# Patient Record
Sex: FEMALE | Race: WHITE | NOT HISPANIC OR LATINO | URBAN - METROPOLITAN AREA
[De-identification: names, ages, dates, MRNs, and addresses within clinical notes are randomized per-mention and may not be internally consistent; named-entity substitution may affect disease eponyms.]

---

## 2024-07-22 ENCOUNTER — OFFICE VISIT (OUTPATIENT)
Dept: OTOLARYNGOLOGY | Facility: CLINIC | Age: 69
End: 2024-07-22
Payer: MEDICARE

## 2024-07-22 ENCOUNTER — CLINICAL SUPPORT (OUTPATIENT)
Dept: AUDIOLOGY | Facility: CLINIC | Age: 69
End: 2024-07-22
Payer: MEDICARE

## 2024-07-22 DIAGNOSIS — H90.3 BILATERAL SENSORINEURAL HEARING LOSS: Primary | ICD-10-CM

## 2024-07-22 DIAGNOSIS — H72.92 PERFORATION OF LEFT TYMPANIC MEMBRANE: ICD-10-CM

## 2024-07-22 DIAGNOSIS — H91.8X3 ASYMMETRICAL HEARING LOSS: Primary | ICD-10-CM

## 2024-07-22 PROCEDURE — 92557 COMPREHENSIVE HEARING TEST: CPT | Performed by: AUDIOLOGIST

## 2024-07-22 PROCEDURE — 1160F RVW MEDS BY RX/DR IN RCRD: CPT | Performed by: OTOLARYNGOLOGY

## 2024-07-22 PROCEDURE — 99213 OFFICE O/P EST LOW 20 MIN: CPT | Performed by: OTOLARYNGOLOGY

## 2024-07-22 PROCEDURE — 92567 TYMPANOMETRY: CPT | Performed by: AUDIOLOGIST

## 2024-07-22 PROCEDURE — 1159F MED LIST DOCD IN RCRD: CPT | Performed by: OTOLARYNGOLOGY

## 2024-07-22 NOTE — PROGRESS NOTES
The patient returns.  Seeing her back today surveillance recheck history of left tympanic membrane perforation with predominantly sensorineural hearing loss.  Patient having no worrisome changes or issues.  Remaining ENT inquiry clear.  There have been no significant changes in past medical or past surgical histories except as mentioned.    Physical exam:  No acute distress.  Examination of the right ear is unremarkable. There is no evidence of middle ear effusion or abnormality of the external auditory canal, tympanic membrane, and external ear itself.  Left ear noted for a small stable perforation otherwise unremarkable.  Nasal exam is clear anteriorly. The septum is relatively straight and the nasal mucosa is grossly unremarkable without evidence of any worrisome infection or polyp or mass. The oral cavity and oropharynx are unremarkable. There is no evidence of any gross lesion or mucosal irregularity throughout the structures. The neck is negative for mass or lymphadenopathy. The trachea and parotid region are clear free of obvious mass or tumor. Facial structures are grossly otherwise unremarkable as well.    Comparative audiogram is stable compared to July 2023    Assessment and plan:  Stable asymmetric hearing loss as described.  Favor observation with recheck in 1 year.  May consider amplification strategies at her leisure.  All questions were answered in this regard accordingly.

## 2024-07-22 NOTE — PROGRESS NOTES
Ms. Bautista, age 68, was seen today for a repeat hearing evaluation during her ENT visit with Dr. Hannah.  She denied concerns or changes since her last visit one year ago.    Results:  Otoscopy revealed clear ear canals with visualized tympanic membrane in her right ear and visualized tympanic membrane perforation in her left ear.  Tympanometry revealed a normal, Type A tympanogram in her right ear, indicating normal ear canal volume, peak pressure and compliance and a flat, Type B tympanogram with large ear canal volume, consistent with known tympanic membrane perforation.  Audiometric thresholds revealed a mild sloping to moderate sensorineural hearing loss bilaterally.  Word recognition scores were excellent bilaterally.  Hearing levels have remained stable as compared to her last evaluation July 2023.    Recommendations:  Follow-up with PCP, Dr. Caldwell, as medically directed.  Follow-up with ENT, Dr. Hannah, as medically directed.  Consider possible binaural amplification.  Retest hearing annually to monitor.

## 2025-07-23 ENCOUNTER — APPOINTMENT (OUTPATIENT)
Dept: OTOLARYNGOLOGY | Facility: CLINIC | Age: 70
End: 2025-07-23
Payer: MEDICARE